# Patient Record
Sex: FEMALE | Race: WHITE | NOT HISPANIC OR LATINO | Employment: OTHER | URBAN - METROPOLITAN AREA
[De-identification: names, ages, dates, MRNs, and addresses within clinical notes are randomized per-mention and may not be internally consistent; named-entity substitution may affect disease eponyms.]

---

## 2017-11-17 ENCOUNTER — TRANSCRIBE ORDERS (OUTPATIENT)
Dept: LAB | Facility: CLINIC | Age: 22
End: 2017-11-17

## 2017-11-17 ENCOUNTER — APPOINTMENT (OUTPATIENT)
Dept: LAB | Facility: CLINIC | Age: 22
End: 2017-11-17
Payer: COMMERCIAL

## 2017-11-17 DIAGNOSIS — M81.0 SENILE OSTEOPOROSIS: Primary | ICD-10-CM

## 2017-11-17 DIAGNOSIS — M81.0 SENILE OSTEOPOROSIS: ICD-10-CM

## 2017-11-17 DIAGNOSIS — E55.9 AVITAMINOSIS D: ICD-10-CM

## 2017-11-17 LAB
25(OH)D3 SERPL-MCNC: 31.6 NG/ML (ref 30–100)
ALBUMIN SERPL BCP-MCNC: 4 G/DL (ref 3.5–5)
ALP SERPL-CCNC: 68 U/L (ref 46–116)
ALT SERPL W P-5'-P-CCNC: 82 U/L (ref 12–78)
ANION GAP SERPL CALCULATED.3IONS-SCNC: 12 MMOL/L (ref 4–13)
AST SERPL W P-5'-P-CCNC: 23 U/L (ref 5–45)
BILIRUB SERPL-MCNC: 0.3 MG/DL (ref 0.2–1)
BUN SERPL-MCNC: 6 MG/DL (ref 5–25)
CALCIUM SERPL-MCNC: 10.1 MG/DL (ref 8.3–10.1)
CHLORIDE SERPL-SCNC: 103 MMOL/L (ref 100–108)
CO2 SERPL-SCNC: 26 MMOL/L (ref 21–32)
CREAT SERPL-MCNC: 0.55 MG/DL (ref 0.6–1.3)
GFR SERPL CREATININE-BSD FRML MDRD: 135 ML/MIN/1.73SQ M
GLUCOSE SERPL-MCNC: 90 MG/DL (ref 65–140)
POTASSIUM SERPL-SCNC: 5 MMOL/L (ref 3.5–5.3)
PROT SERPL-MCNC: 8.5 G/DL (ref 6.4–8.2)
SODIUM SERPL-SCNC: 141 MMOL/L (ref 136–145)

## 2017-11-17 PROCEDURE — 84080 ASSAY ALKALINE PHOSPHATASES: CPT

## 2017-11-17 PROCEDURE — 80053 COMPREHEN METABOLIC PANEL: CPT

## 2017-11-17 PROCEDURE — 82523 COLLAGEN CROSSLINKS: CPT

## 2017-11-17 PROCEDURE — 36415 COLL VENOUS BLD VENIPUNCTURE: CPT

## 2017-11-17 PROCEDURE — 82306 VITAMIN D 25 HYDROXY: CPT

## 2017-11-21 LAB — ALP BONE SERPL-MCNC: 12.7 UG/L

## 2017-11-23 LAB — COLLAGEN CTX SERPL-MCNC: 142 PG/ML

## 2022-01-12 ENCOUNTER — IMMUNIZATIONS (OUTPATIENT)
Dept: FAMILY MEDICINE CLINIC | Facility: HOSPITAL | Age: 27
End: 2022-01-12

## 2022-01-12 DIAGNOSIS — Z23 ENCOUNTER FOR IMMUNIZATION: Primary | ICD-10-CM

## 2022-01-12 PROCEDURE — 91306 COVID-19 MODERNA VACC 0.25 ML BOOSTER: CPT

## 2022-01-12 PROCEDURE — 0064A COVID-19 MODERNA VACC 0.25 ML BOOSTER: CPT

## 2025-01-29 PROBLEM — Z98.2 PRESENCE OF CEREBROSPINAL FLUID DRAINAGE DEVICE: Status: ACTIVE | Noted: 2025-01-29

## 2025-01-29 PROBLEM — K21.9 ESOPHAGEAL REFLUX: Status: ACTIVE | Noted: 2025-01-29

## 2025-01-29 PROBLEM — K59.04 FUNCTIONAL CONSTIPATION: Status: ACTIVE | Noted: 2025-01-29

## 2025-01-29 PROBLEM — G80.9 INFANTILE CEREBRAL PALSY (HCC): Status: ACTIVE | Noted: 2025-01-29

## 2025-01-29 PROBLEM — M20.60 ACQUIRED DEFORMITY OF JOINT OF BIG TOE: Status: ACTIVE | Noted: 2025-01-29

## 2025-01-29 PROBLEM — Q04.3: Status: ACTIVE | Noted: 2025-01-29

## 2025-01-29 PROBLEM — Z93.1 GASTROSTOMY TUBE DEPENDENT (HCC): Status: ACTIVE | Noted: 2025-01-29

## 2025-01-29 PROBLEM — E55.9 VITAMIN D DEFICIENCY: Status: ACTIVE | Noted: 2025-01-29

## 2025-01-29 PROBLEM — M25.376 INSTABILITY OF FOOT JOINT: Status: ACTIVE | Noted: 2025-01-29

## 2025-01-29 PROBLEM — F79 INTELLECTUAL DISABILITY: Status: ACTIVE | Noted: 2025-01-29

## 2025-01-29 PROBLEM — Q65.89 CONGENITAL DYSPLASIA OF BOTH HIPS: Status: ACTIVE | Noted: 2025-01-29

## 2025-01-29 PROBLEM — G91.1 OBSTRUCTIVE HYDROCEPHALUS (HCC): Status: ACTIVE | Noted: 2025-01-29

## 2025-01-29 PROBLEM — M41.20 IDIOPATHIC SCOLIOSIS AND KYPHOSCOLIOSIS: Status: ACTIVE | Noted: 2025-01-29

## 2025-01-29 PROBLEM — Q04.2 HOLOPROSENCEPHALY (HCC): Status: ACTIVE | Noted: 2025-01-29

## 2025-01-29 PROBLEM — M81.8 OTHER OSTEOPOROSIS WITHOUT CURRENT PATHOLOGICAL FRACTURE: Status: ACTIVE | Noted: 2025-01-29

## 2025-01-29 PROBLEM — E28.1 HYPERANDROGENEMIA: Status: ACTIVE | Noted: 2025-01-29

## 2025-01-29 RX ORDER — BACITRACIN, NEOMYCIN, POLYMYXIN B 400; 3.5; 5 [USP'U]/G; MG/G; [USP'U]/G
OINTMENT TOPICAL
COMMUNITY

## 2025-01-29 RX ORDER — PREDNISOLONE SODIUM PHOSPHATE 15 MG/5ML
SOLUTION ORAL
COMMUNITY

## 2025-01-29 RX ORDER — CHOLECALCIFEROL (VITAMIN D3) 10(400)/ML
DROPS ORAL
COMMUNITY

## 2025-01-29 RX ORDER — UNDERPADS 23" X 36"
EACH MISCELLANEOUS
COMMUNITY

## 2025-01-29 RX ORDER — IBUPROFEN 100 MG/5ML
SUSPENSION ORAL
COMMUNITY

## 2025-01-29 RX ORDER — AZITHROMYCIN 200 MG/5ML
POWDER, FOR SUSPENSION ORAL
COMMUNITY

## 2025-01-29 RX ORDER — POLYETHYLENE GLYCOL 3350 17 G/17G
POWDER, FOR SOLUTION ORAL
COMMUNITY

## 2025-01-29 RX ORDER — DIAZEPAM 2 MG/1
TABLET ORAL
COMMUNITY

## 2025-01-29 RX ORDER — BROMPHENIRAMINE MALEATE, PSEUDOEPHEDRINE HYDROCHLORIDE, AND DEXTROMETHORPHAN HYDROBROMIDE 2; 30; 10 MG/5ML; MG/5ML; MG/5ML
SYRUP ORAL
COMMUNITY

## 2025-01-29 RX ORDER — FEXOFENADINE HCL AND PSEUDOEPHEDRINE HCL 180; 240 MG/1; MG/1
TABLET, EXTENDED RELEASE ORAL
COMMUNITY

## 2025-01-29 RX ORDER — ECHINACEA PURPUREA EXTRACT 125 MG
TABLET ORAL
COMMUNITY

## 2025-01-29 RX ORDER — ALBUTEROL SULFATE 0.83 MG/ML
SOLUTION RESPIRATORY (INHALATION)
COMMUNITY

## 2025-01-29 RX ORDER — BACITRACIN ZINC AND POLYMYXIN B SULFATE 500; 1000 [USP'U]/G; [USP'U]/G
OINTMENT TOPICAL
COMMUNITY

## 2025-01-29 RX ORDER — ACETAMINOPHEN 160 MG/5ML
LIQUID ORAL
COMMUNITY

## 2025-01-29 RX ORDER — PROMETHAZINE HYDROCHLORIDE 25 MG/1
SUPPOSITORY RECTAL
COMMUNITY

## 2025-01-29 RX ORDER — NYSTATIN 100000 [USP'U]/ML
SUSPENSION ORAL
COMMUNITY

## 2025-01-29 RX ORDER — LORATADINE ORAL 5 MG/5ML
SOLUTION ORAL
COMMUNITY

## 2025-01-30 ENCOUNTER — EVALUATION (OUTPATIENT)
Dept: SPEECH THERAPY | Facility: CLINIC | Age: 30
End: 2025-01-30
Payer: MEDICARE

## 2025-01-30 DIAGNOSIS — R47.1 DYSARTHRIA: Primary | ICD-10-CM

## 2025-01-30 DIAGNOSIS — R41.841 COGNITIVE COMMUNICATION DISORDER: ICD-10-CM

## 2025-01-30 DIAGNOSIS — R47.89 FLUENCY DISORDER: ICD-10-CM

## 2025-01-30 DIAGNOSIS — G80.8 OTHER CEREBRAL PALSY (HCC): ICD-10-CM

## 2025-01-30 PROCEDURE — 92521 EVALUATION OF SPEECH FLUENCY: CPT | Performed by: SPEECH-LANGUAGE PATHOLOGIST

## 2025-01-30 PROCEDURE — 92522 EVALUATE SPEECH PRODUCTION: CPT | Performed by: SPEECH-LANGUAGE PATHOLOGIST

## 2025-01-30 NOTE — PROGRESS NOTES
Speech-Language Pathology Initial Evaluation    Today's date: 2025   Patient’s name: Rafaela Pruitt  : 1995  MRN: [id number]  Safety measures: h/o CP, ID, fall risk  Referring provider: Griselda Suggs MD    Encounter Diagnosis     ICD-10-CM    1. Dysarthria  R47.1       2. Fluency disorder  R47.89       3. Cognitive communication disorder  R41.841       4. Other cerebral palsy (HCC)  G80.8         Assessment:  Patient participated in a formal fluency evaluation with the SSI-4 on this date of service. Patient's scores correlated with a moderate fluency disorder c/b stuttering-like dysfluencies (9 sound/syllable repetitions and 4 blocks in the speaking task & 1 sound/syllable repetitions and 6 blocks) and secondary behaviors (lip pressing & turning away). It should also be noted that patient demonstrated 60 non-stuttering-like dysfluencies (12 phrase repetitions, 32 interjections, and 16 revisions) in the speaking task. During formal and informal connected speech tasks during the motor speech evaluation, patient was judged to have imprecise articulation, moments of fast rate of speech, reduced vocal intensity/decreased breath support, and breathy vocal quality. Patient's speech intelligibility was judged to be 70% to this unfamiliar listener. All of these factors impact patient's overall communication effectiveness. Patient would benefit from outpatient skilled Speech Therapy services for education/training on speech intelligibility strategies, for therapeutic exercises, for practice with clinician to increase communication success with both familiar and unfamiliar listeners, to target fluency strategies implementation, to facilitate overall improved quality of life, to reduce caregiver burden, for instruction on HEP, and to complete caregiver education/training. Although not formally assessed, patient's mother and nurse reported that cognitive changes are also present. Clinician informally observed this  during session. A goal targeting the implementation of external memory aids (journaling) was also added to the POC to promote increased functional recall and positive communication interactions.      Short-term goals:   Patient and caregivers will be educated on fluency strategies to increase patient's communication effectiveness (to be achieved in 2-3 weeks).     Patient will utilize compensatory fluency strategies (e.g., stuttering modification and fluency enhancing) provided mod verbal cues during scenario-based conversational speaking tasks to decrease stuttering events to <4% (to be achieved in 6-8 weeks).     Patient and caregivers will be educated on speech intelligibility strategies (e.g., over-exaggeration, slow rate, increasing volume, etc.) to promote patient's increased communication success (to be achieved in 2-3 weeks).     Patient will utilize speech intelligibility strategies with min verbal cues while reading words, sentences, and paragraph-length material with <5 communication breakdowns across 5 consecutive sessions to facilitate increased generalization of skills into conversational speech with (to be achieved in 6-8 weeks).     Patient will practice diaphragmatic breathing exercises and sustain /ah/ for >10 seconds to facilitate increased breath support for voice/speaking (to be achieved in 6-8 weeks).     Patient will recall details from her week by utilizing external memory aid strategies (e.g., journaling, note taking, etc.) across 5 consecutive sessions to promote increased functional recall and positive communication interactions (to be achieved in 6-8 weeks).     Long-term goals:  Patient will increase her communication effectiveness in the home and community settings (to be achieved by discharge).     Patient's caregiver's with demonstrate comprehension of patient's recommended HEP (to be achieved by discharge).        Plan:  Patient would benefit from outpatient skilled Speech Therapy  "services: Speech-language therapy    Frequency: 1x every other week (Patient's mother expressed that this will work best with patient's schedule.)  Duration: 3 months    Intervention certification from: 1/30/2025  Intervention certification to: 04/30/2025      Subjective:  History of present illness: Patient is a 29 y.o. female who was referred to outpatient skilled Speech Therapy services for an evaluation. Patient was accompanied to today's session by her mother (Mary) and nurse (Liliana). It was reported that patient's outpatient occupational therapist at [facility] ([place]) recommended that she follow-up with speech therapy services due to cognitive changes, as well as reduced speech intelligibility. Patient's mother stated that patient received OP ST services with [facility] in 2021/2022. Patient's mother and nurse indicated that patient's conversational speech can be c/b prolongations, needing to \"reset a few times\", and having a softer volume of voice production. Patient is, however, able to sing and read without difficulty with fluency per report. It was reported that this was targeted in OP ST services in the past, as well as short-term recall (was journaling, but no longer using this strategy). It was noted that patient has trouble remembering what she ate for breakfast or which classes she had on a specific day (when she was still in school). Patient with strong recall of dialogues in movies and names of actors/actresses. It was reported that patient has trouble \"getting things out...it takes longer\". Patient's mother suspects that this may be related to the amount of sleep she gets the night before.     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 The following results were gathered during today’s evaluation:    Section: Findings/Observations: Score:   *FREQUENCY:  10   -Reading Task (166 syllables) 4.2% syllables stuttered    -Speaking Task (300 syllables) 4.3% syllables stuttered         *DURATION: (avg 3 longest moments of stuttering events) 0.5-0.9 seconds 4        *PHYSICAL CONCOMITANTS: Lip pressing and Turning away 4       TOTAL OVERALL SCORE: 18   Percentile: 61-77%ile   Severity Rating: Moderate       Motor Speech Evaluation:  Patient did not tolerate a formal oral motor evaluation on this date of service. Patient with natural dentition. Facial appearance was symmetrical. Mandible, lingual, labial, and velar functions were unable to be assessed formally. Patient with no anterior loss of saliva. No apraxia was noted. Vocal quality was judged to be breathy with lower vocal intensity. Maximum phonation time testing was attempted, but patient demonstrated difficulty with following direction to sustain /ah/. DDK rates were judged to be slower and irregular. During formal and informal connected speech tasks, patient was judged to have imprecise articulation, moments of fast rate of speech, reduced vocal intensity/decreased breath support, and breathy vocal quality. Patient's speech intelligibility was judged to be 70% to this unfamiliar listener.      Treatment:  N/A      Visit Tracking:  POC   Expires Auth Expiration Date ST Visit Limit   04/30/2025 Pending  BOMN          Visit/Unit Tracking:  Auth Status Date 0130/25   Auth required after IE Used 1    Remaining TBD

## 2025-01-30 NOTE — LETTER
2025    Griselda Suggs MD  [address] [facility] [address]    Patient: Rafaela Pruitt   YOB: 1995   Date of Visit: 2025     Encounter Diagnosis     ICD-10-CM    1. Dysarthria  R47.1       2. Fluency disorder  R47.89       3. Cognitive communication disorder  R41.841       4. Other cerebral palsy (HCC)  G80.8           Dear Dr. Suggs:    Thank you for your recent referral of Rafaela Pruitt. Please review the attached evaluation summary from Rafaela's recent visit.     Please verify that you agree with the plan of care by signing the attached order.     If you have any questions or concerns, please do not hesitate to call.     I sincerely appreciate the opportunity to share in the care of one of your patients and hope to have another opportunity to work with you in the near future.     Sincerely,    Berna Thorpe CCC-SLP      Referring Provider:     Based upon review of the patient's progress and continued therapy plan, it is my medical opinion that Rafaela Pruitt should continue speech therapy treatment at the Physical Therapy at [address]:                    Griselda Suggs MD  [address] [facility] [address]  Via Fax: [phone]        Speech-Language Pathology Initial Evaluation    Today's date: 2025   Patient’s name: Rafaela Pruitt  : 1995  MRN: [id number]  Safety measures: h/o CP, ID, fall risk  Referring provider: Griselda Suggs MD    Encounter Diagnosis     ICD-10-CM    1. Dysarthria  R47.1       2. Fluency disorder  R47.89       3. Cognitive communication disorder  R41.841       4. Other cerebral palsy (HCC)  G80.8         Assessment:  Patient participated in a formal fluency evaluation with the SSI-4 on this date of service. Patient's scores correlated with a moderate fluency disorder c/b stuttering-like dysfluencies (9 sound/syllable repetitions and 4 blocks in the speaking task & 1 sound/syllable repetitions and 6  blocks) and secondary behaviors (lip pressing & turning away). It should also be noted that patient demonstrated 60 non-stuttering-like dysfluencies (12 phrase repetitions, 32 interjections, and 16 revisions) in the speaking task. During formal and informal connected speech tasks during the motor speech evaluation, patient was judged to have imprecise articulation, moments of fast rate of speech, reduced vocal intensity/decreased breath support, and breathy vocal quality. Patient's speech intelligibility was judged to be 70% to this unfamiliar listener. All of these factors impact patient's overall communication effectiveness. Patient would benefit from outpatient skilled Speech Therapy services for education/training on speech intelligibility strategies, for therapeutic exercises, for practice with clinician to increase communication success with both familiar and unfamiliar listeners, to target fluency strategies implementation, to facilitate overall improved quality of life, to reduce caregiver burden, for instruction on HEP, and to complete caregiver education/training. Although not formally assessed, patient's mother and nurse reported that cognitive changes are also present. Clinician informally observed this during session. A goal targeting the implementation of external memory aids (journaling) was also added to the POC to promote increased functional recall and positive communication interactions.      Short-term goals:   Patient and caregivers will be educated on fluency strategies to increase patient's communication effectiveness (to be achieved in 2-3 weeks).     Patient will utilize compensatory fluency strategies (e.g., stuttering modification and fluency enhancing) provided mod verbal cues during scenario-based conversational speaking tasks to decrease stuttering events to <4% (to be achieved in 6-8 weeks).     Patient and caregivers will be educated on speech intelligibility strategies (e.g.,  over-exaggeration, slow rate, increasing volume, etc.) to promote patient's increased communication success (to be achieved in 2-3 weeks).     Patient will utilize speech intelligibility strategies with min verbal cues while reading words, sentences, and paragraph-length material with <5 communication breakdowns across 5 consecutive sessions to facilitate increased generalization of skills into conversational speech with (to be achieved in 6-8 weeks).     Patient will practice diaphragmatic breathing exercises and sustain /ah/ for >10 seconds to facilitate increased breath support for voice/speaking (to be achieved in 6-8 weeks).     Patient will recall details from her week by utilizing external memory aid strategies (e.g., journaling, note taking, etc.) across 5 consecutive sessions to promote increased functional recall and positive communication interactions (to be achieved in 6-8 weeks).     Long-term goals:  Patient will increase her communication effectiveness in the home and community settings (to be achieved by discharge).     Patient's caregiver's with demonstrate comprehension of patient's recommended HEP (to be achieved by discharge).        Plan:  Patient would benefit from outpatient skilled Speech Therapy services: Speech-language therapy    Frequency: 1x every other week (Patient's mother expressed that this will work best with patient's schedule.)  Duration: 3 months    Intervention certification from: 1/30/2025  Intervention certification to: 04/30/2025      Subjective:  History of present illness: Patient is a 29 y.o. female who was referred to outpatient skilled Speech Therapy services for an evaluation. Patient was accompanied to today's session by her mother (Mary) and nurse (Liliaan). It was reported that patient's outpatient occupational therapist at [facility] ([place]) recommended that she follow-up with speech therapy services due to cognitive changes, as well as reduced speech  "intelligibility. Patient's mother stated that patient received OP ST services with [facility] in 2021/2022. Patient's mother and nurse indicated that patient's conversational speech can be c/b prolongations, needing to \"reset a few times\", and having a softer volume of voice production. Patient is, however, able to sing and read without difficulty with fluency per report. It was reported that this was targeted in OP ST services in the past, as well as short-term recall (was journaling, but no longer using this strategy). It was noted that patient has trouble remembering what she ate for breakfast or which classes she had on a specific day (when she was still in school). Patient with strong recall of dialogues in movies and names of actors/actresses. It was reported that patient has trouble \"getting things out...it takes longer\". Patient's mother suspects that this may be related to the amount of sleep she gets the night before.     Patient's mother & nurse's goal(s): \"more fluid speech, processing the thought before it comes out, more clear with reading/talking\"    Pain: Absent (scale: 0/10)    Hearing: WFL for testing (reduced hearing in L ear)  Vision: WFL for testing (wears corrective lens)    Home environment/lifestyle: Lives with mother and father (24-hour nursing services)  Highest level of education: High school  Vocational status: N/A -- volunteered prior to COVID-19 pandemic      Objective:  The Stuttering Severity Instrument 4th edition (SSI-4) is a standardized assessment that can be used for  children, school-aged children, and adults to assess stuttering severity across 4 different areas; (1) frequency of stutter, (2) duration of stutter, (3) physical concomitant behaviors, and (4) naturalness of speech. The following results were gathered during today’s evaluation:    Section: Findings/Observations: Score:   *FREQUENCY:  10   -Reading Task (166 syllables) 4.2% syllables stuttered    -Speaking Task " (300 syllables) 4.3% syllables stuttered         *DURATION: (avg 3 longest moments of stuttering events) 0.5-0.9 seconds 4        *PHYSICAL CONCOMITANTS: Lip pressing and Turning away 4       TOTAL OVERALL SCORE: 18   Percentile: 61-77%ile   Severity Rating: Moderate       Motor Speech Evaluation:  Patient did not tolerate a formal oral motor evaluation on this date of service. Patient with natural dentition. Facial appearance was symmetrical. Mandible, lingual, labial, and velar functions were unable to be assessed formally. Patient with no anterior loss of saliva. No apraxia was noted. Vocal quality was judged to be breathy with lower vocal intensity. Maximum phonation time testing was attempted, but patient demonstrated difficulty with following direction to sustain /ah/. DDK rates were judged to be slower and irregular. During formal and informal connected speech tasks, patient was judged to have imprecise articulation, moments of fast rate of speech, reduced vocal intensity/decreased breath support, and breathy vocal quality. Patient's speech intelligibility was judged to be 70% to this unfamiliar listener.      Treatment:  N/A      Visit Tracking:  POC   Expires Auth Expiration Date ST Visit Limit   04/30/2025 Pending BOMN          Visit/Unit Tracking:  Auth Status Date 0130/25   Auth required after IE Used 1    Remaining TBD

## 2025-02-02 NOTE — PROGRESS NOTES
Daily Speech Treatment Note    Today's date: 2025 ***  Patient’s name: Rafaela Pruitt  : 1995  MRN: [id number]  Safety measures: h/o CP, ID, fall risk   Referring provider: Griselda Suggs MD    Encounter Diagnosis     ICD-10-CM    1. Dysarthria  R47.1       2. Fluency disorder  R47.89       3. Cognitive communication disorder  R41.841       4. Other cerebral palsy (HCC)  G80.8         Visit Tracking:  ***    Subjective/Behavioral:  -***    Objective/Assessment:  -Patient's family member/caregiver was present during today's session.    Short-term goals:  Patient and caregivers will be educated on fluency strategies to increase patient's communication effectiveness (to be achieved in 2-3 weeks).      Patient will utilize compensatory fluency strategies (e.g., stuttering modification and fluency enhancing) provided mod verbal cues during scenario-based conversational speaking tasks to decrease stuttering events to <4% (to be achieved in 6-8 weeks).      Patient and caregivers will be educated on speech intelligibility strategies (e.g., over-exaggeration, slow rate, increasing volume, etc.) to promote patient's increased communication success (to be achieved in 2-3 weeks).      Patient will utilize speech intelligibility strategies with min verbal cues while reading words, sentences, and paragraph-length material with <5 communication breakdowns across 5 consecutive sessions to facilitate increased generalization of skills into conversational speech with (to be achieved in 6-8 weeks).      Patient will practice diaphragmatic breathing exercises and sustain /ah/ for >10 seconds to facilitate increased breath support for voice/speaking (to be achieved in 6-8 weeks).      Patient will recall details from her week by utilizing external memory aid strategies (e.g., journaling, note taking, etc.) across 5 consecutive sessions to promote increased functional recall and positive communication interactions (to be  achieved in 6-8 weeks).     Plan:  -Continue with current plan of care.

## 2025-02-06 ENCOUNTER — APPOINTMENT (OUTPATIENT)
Dept: SPEECH THERAPY | Facility: CLINIC | Age: 30
End: 2025-02-06
Payer: MEDICARE

## 2025-02-06 DIAGNOSIS — R47.1 DYSARTHRIA: Primary | ICD-10-CM

## 2025-02-06 DIAGNOSIS — R41.841 COGNITIVE COMMUNICATION DISORDER: ICD-10-CM

## 2025-02-06 DIAGNOSIS — R47.89 FLUENCY DISORDER: ICD-10-CM

## 2025-02-06 DIAGNOSIS — G80.8 OTHER CEREBRAL PALSY (HCC): ICD-10-CM

## 2025-02-10 NOTE — PROGRESS NOTES
Daily Speech Treatment Note    Today's date: 2025  Patient’s name: Rafaela Pruitt  : 1995  MRN: [id number]  Safety measures: h/o CP, ID, fall risk   Referring provider: Griselda Suggs MD    Encounter Diagnosis     ICD-10-CM    1. Dysarthria  R47.1       2. Fluency disorder  R47.89       3. Cognitive communication disorder  R41.841       4. Other cerebral palsy (HCC)  G80.8         Visit Tracking:  POC   Expires Auth Expiration Date ST Visit Limit   2025 BOMN          Visit/Unit Tracking:  Auth Status Date 25   8 authorized visits  on 2025 Used 1 2    Remaining 7 6     Subjective/Behavioral:  -Patient reported that she was doing well today. She shared in recent events pertaining to [facility] Day with the assistance of her caregiver.     Objective/Assessment:  -Patient's family member/caregiver was present during today's session.    Short-term goals:  Patient and caregivers will be educated on fluency strategies to increase patient's communication effectiveness (to be achieved in 2-3 weeks).   -Clinician created visual cues (index cards with pictures) for patient to practice easy onsets and light contacts.      Patient will utilize compensatory fluency strategies (e.g., stuttering modification and fluency enhancing) provided mod verbal cues during scenario-based conversational speaking tasks to decrease stuttering events to <4% (to be achieved in 6-8 weeks).     Patient and caregivers will be educated on speech intelligibility strategies (e.g., over-exaggeration, slow rate, increasing volume, etc.) to promote patient's increased communication success (to be achieved in 2-3 weeks).   -Clinician created visual cues (index cards with pictures) for patient to practice slow rate of speech, overexaggeration, and pausing.      Patient will utilize speech intelligibility strategies with min verbal cues while reading words, sentences, and paragraph-length material  "with <5 communication breakdowns across 5 consecutive sessions to facilitate increased generalization of skills into conversational speech with (to be achieved in 6-8 weeks).   -Clinician worked with patient and caregiver on developing a list of 10 \"functional phrases\" for patient to practice reading with strategies.      -Patient practiced slow rate of speech and easy onsets provided mod-max verbal/visual cues from clinician today.    Patient will practice diaphragmatic breathing exercises and sustain /ah/ for >10 seconds to facilitate increased breath support for voice/speaking (to be achieved in 6-8 weeks).   -Clinician completed trials of LSVT LOUD with patient today. It is suspected that patient may not be an appropriate candidate secondary to her sensitivity with loud sounds. Clinician was mindful of this when completing models of exercises; attempted to cue patient to \"THINK LOUD!\" to assess if she had a carryover with improved respiratory support, vocal intensity, articulation, rate of speech. Patient benefited from max verbal/visual cues.    Sustained /ah/ x5 (average: 9.52 seconds at 72 dB SPL)    Attempted glides over multiple trials in unison with clinician; cues were not effective with shaping into an effective voice  High (499 Hz at 81 dB SPL)  Low (140 Hz at 71 dB SPL)     Patient will recall details from her week by utilizing external memory aid strategies (e.g., journaling, note taking, etc.) across 5 consecutive sessions to promote increased functional recall and positive communication interactions (to be achieved in 6-8 weeks).     Plan:  -Continue with current plan of care.  "

## 2025-02-20 ENCOUNTER — OFFICE VISIT (OUTPATIENT)
Dept: SPEECH THERAPY | Facility: CLINIC | Age: 30
End: 2025-02-20
Payer: MEDICARE

## 2025-02-20 DIAGNOSIS — R41.841 COGNITIVE COMMUNICATION DISORDER: ICD-10-CM

## 2025-02-20 DIAGNOSIS — G80.8 OTHER CEREBRAL PALSY (HCC): ICD-10-CM

## 2025-02-20 DIAGNOSIS — R47.89 FLUENCY DISORDER: ICD-10-CM

## 2025-02-20 DIAGNOSIS — R47.1 DYSARTHRIA: Primary | ICD-10-CM

## 2025-02-20 PROCEDURE — 92507 TX SP LANG VOICE COMM INDIV: CPT | Performed by: SPEECH-LANGUAGE PATHOLOGIST

## 2025-04-02 ENCOUNTER — HOSPITAL ENCOUNTER (OUTPATIENT)
Dept: RADIOLOGY | Facility: HOSPITAL | Age: 30
Discharge: HOME | End: 2025-04-02
Payer: MEDICARE

## 2025-04-02 DIAGNOSIS — G91.1 OBSTRUCTIVE HYDROCEPHALUS (HCC): ICD-10-CM

## 2025-04-02 PROCEDURE — 70250 X-RAY EXAM OF SKULL: CPT

## 2025-04-02 PROCEDURE — 71046 X-RAY EXAM CHEST 2 VIEWS: CPT

## 2025-04-02 PROCEDURE — 74018 RADEX ABDOMEN 1 VIEW: CPT

## 2025-04-03 ENCOUNTER — OFFICE VISIT (OUTPATIENT)
Dept: SPEECH THERAPY | Facility: CLINIC | Age: 30
End: 2025-04-03
Payer: MEDICARE

## 2025-04-03 DIAGNOSIS — R47.1 DYSARTHRIA: Primary | ICD-10-CM

## 2025-04-03 DIAGNOSIS — R47.89 FLUENCY DISORDER: ICD-10-CM

## 2025-04-03 DIAGNOSIS — R41.841 COGNITIVE COMMUNICATION DISORDER: ICD-10-CM

## 2025-04-03 DIAGNOSIS — G80.8 OTHER CEREBRAL PALSY (HCC): ICD-10-CM

## 2025-04-03 PROCEDURE — 92507 TX SP LANG VOICE COMM INDIV: CPT | Performed by: SPEECH-LANGUAGE PATHOLOGIST

## 2025-04-03 NOTE — PROGRESS NOTES
"Daily Speech Treatment Note    Today's date: 4/3/2025  Patient’s name: Rafaela Pruitt  : 1995  MRN: [id number]  Safety measures: h/o CP, ID, fall risk   Referring provider: Griselda Suggs MD    Encounter Diagnosis     ICD-10-CM    1. Dysarthria  R47.1       2. Fluency disorder  R47.89       3. Cognitive communication disorder  R41.841       4. Other cerebral palsy (HCC)  G80.8           Visit Tracking:  POC   Expires Auth Expiration Date ST Visit Limit   2025 BOMN              Visit/Unit Tracking:  Auth Status Date 25   8 authorized visits  on 2025 Used 1 2     Remaining 7 6     Visit/Unit Tracking:  Auth Status Date 25   8 authorized visits  on 2025 Used 1    Remaining 7     Subjective/Behavioral:  -Patient reported that she was feeling good today.     Objective/Assessment:  -Patient's family member/caregiver was present during today's session. (Nurse Richardson)    Short-term goals:  Patient and caregivers will be educated on fluency strategies to increase patient's communication effectiveness (to be achieved in 2-3 weeks).      Patient will utilize compensatory fluency strategies (e.g., stuttering modification and fluency enhancing) provided mod verbal cues during scenario-based conversational speaking tasks to decrease stuttering events to <4% (to be achieved in 6-8 weeks).   -Patient was noted to have mild blocks on words beginning with \"e\" and \"a\" today. Patient benefited from models from clinician to implement easy onsets. Patient also had mild blocks on words beginning with \"wh\". Patient benefited from models from clinician to implement light contacts.    Patient and caregivers will be educated on speech intelligibility strategies (e.g., over-exaggeration, slow rate, increasing volume, etc.) to promote patient's increased communication success (to be achieved in 2-3 weeks).   -Patient's caregiver reported that she has been practicing " "strategies with patient in the home setting (\"Take a breath and let your voice come out.\")     Patient will utilize speech intelligibility strategies with min verbal cues while reading words, sentences, and paragraph-length material with <5 communication breakdowns across 5 consecutive sessions to facilitate increased generalization of skills into conversational speech with (to be achieved in 6-8 weeks).   -Oral reading--sentences ([place] quotes): Patient was instructed to practice slow rate, increasing vocal intensity, and easy onsets provided visual and verbal cues from clinician during an oral reading exercise. Patient benefited from clinician's mod-max verbal cues. Vocal intensity continues to be reduced. Patient's rate of speech and use of easy onsets was noted to improve as task progressed.    Patient will practice diaphragmatic breathing exercises and sustain /ah/ for >10 seconds to facilitate increased breath support for voice/speaking (to be achieved in 6-8 weeks).      Patient will recall details from her week by utilizing external memory aid strategies (e.g., journaling, note taking, etc.) across 5 consecutive sessions to promote increased functional recall and positive communication interactions (to be achieved in 6-8 weeks).   -Clinician presented patient with a blank monthly calendar and assisted her with writing events in the dates to increase her functional recall (e.g., x-rays yesterday, speech therapy today, and Dad's trip to [place]). Patient benefited from mod cues from her caregiver to recall events that occurred this week. Clinician encouraged patient and caregiver(s) to continue writing in spaces daily to promote patient with increased recall and positive communication interactions. Will return to next appointment.    Plan:  -Patient was provided with home exercises/activities to target goals in plan of care at the end of today's session.  -Continue with current plan of care.  "

## 2025-04-13 NOTE — PROGRESS NOTES
"Daily Speech Treatment Note    Today's date: 2025  Patient’s name: Rafaela Pruitt  : 1995  MRN: [id number]  Safety measures: h/o CP, ID, fall risk   Referring provider: Griselda Suggs MD    Encounter Diagnosis     ICD-10-CM    1. Dysarthria  R47.1       2. Fluency disorder  R47.89       3. Cognitive communication disorder  R41.841       4. Other cerebral palsy (HCC)  G80.8         Visit Tracking:  Visit Tracking:  POC   Expires Auth Expiration Date ST Visit Limit   2025 BOMN              Visit/Unit Tracking:  Auth Status Date 25   8 authorized visits  on 2025 Used 1 2     Remaining 7 6      Visit/Unit Tracking:  Auth Status Date 25   8 authorized visits  on 2025 Used 1 2     Remaining 7 6     Subjective/Behavioral:  -Patient brought along her /journal today.    Objective/Assessment:  -Patient's family member/caregiver was present during today's session.    Short-term goals:  Patient and caregivers will be educated on fluency strategies to increase patient's communication effectiveness (to be achieved in 2-3 weeks).      Patient will utilize compensatory fluency strategies (e.g., stuttering modification and fluency enhancing) provided mod verbal cues during scenario-based conversational speaking tasks to decrease stuttering events to <4% (to be achieved in 6-8 weeks).   -Patient was noted to have frequent phrase repetitions today, especially when excited. There may also be a component related to language organization and word retrieval. Clinician spoke with caregiver how today's focus of treatment was on blocks (I.e., utilizing \"easy speech\" / easy onsets).     Patient and caregivers will be educated on speech intelligibility strategies (e.g., over-exaggeration, slow rate, increasing volume, etc.) to promote patient's increased communication success (to be achieved in 2-3 weeks).   -Patient was noted to benefit from " "cues to use \"big mouth movements\" to slow her rate, increase her vocal intensity, and overexaggeration of speech sounds. Continue with practice.     Patient will utilize speech intelligibility strategies with min verbal cues while reading words, sentences, and paragraph-length material with <5 communication breakdowns across 5 consecutive sessions to facilitate increased generalization of skills into conversational speech with (to be achieved in 6-8 weeks).   -Oral reading--sentences (functional phrases): Patient was instructed to practice \"easy speech\" and slow rate provided visual and verbal cues from clinician. Patient was noted to be successful during trials. Good fluency and speech intelligibility present.     -Oral reading--sentences ([place] quotes, Day 2): Patient was instructed to practice \"easy speech\", slow rate, and big mouth movements provided visual and verbal cues from clinician during an oral reading exercise. Patient benefited from clinician's mod-max verbal cues when experiencing a breakdown with communication.     Patient will practice diaphragmatic breathing exercises and sustain /ah/ for >10 seconds to facilitate increased breath support for voice/speaking (to be achieved in 6-8 weeks).      Patient will recall details from her week by utilizing external memory aid strategies (e.g., journaling, note taking, etc.) across 5 consecutive sessions to promote increased functional recall and positive communication interactions (to be achieved in 6-8 weeks).   -Patient utilized her external memory aid (April calendar/journal) to share in events that occurred since her last therapy session. Patient required max cues to orient to today's date. Patient was able to successfully use the notes written in the daily slots to recall events (e.g., made Bulgarian rice with Mom, PT/OT sessions, Sneha hoover with Miss Ford).    Plan:  -Continue with current plan of care.  "

## 2025-04-17 ENCOUNTER — OFFICE VISIT (OUTPATIENT)
Dept: SPEECH THERAPY | Facility: CLINIC | Age: 30
End: 2025-04-17
Payer: MEDICARE

## 2025-04-17 DIAGNOSIS — R47.89 FLUENCY DISORDER: ICD-10-CM

## 2025-04-17 DIAGNOSIS — R47.1 DYSARTHRIA: Primary | ICD-10-CM

## 2025-04-17 DIAGNOSIS — G80.8 OTHER CEREBRAL PALSY (HCC): ICD-10-CM

## 2025-04-17 DIAGNOSIS — R41.841 COGNITIVE COMMUNICATION DISORDER: ICD-10-CM

## 2025-04-17 PROCEDURE — 92507 TX SP LANG VOICE COMM INDIV: CPT | Performed by: SPEECH-LANGUAGE PATHOLOGIST

## 2025-05-01 ENCOUNTER — APPOINTMENT (OUTPATIENT)
Dept: SPEECH THERAPY | Facility: CLINIC | Age: 30
End: 2025-05-01
Payer: MEDICARE

## 2025-05-15 ENCOUNTER — EVALUATION (OUTPATIENT)
Dept: SPEECH THERAPY | Facility: CLINIC | Age: 30
End: 2025-05-15
Payer: MEDICARE

## 2025-05-15 DIAGNOSIS — R47.1 DYSARTHRIA: Primary | ICD-10-CM

## 2025-05-15 DIAGNOSIS — R41.841 COGNITIVE COMMUNICATION DISORDER: ICD-10-CM

## 2025-05-15 DIAGNOSIS — G80.8 OTHER CEREBRAL PALSY (HCC): ICD-10-CM

## 2025-05-15 DIAGNOSIS — R47.89 FLUENCY DISORDER: ICD-10-CM

## 2025-05-15 PROCEDURE — 92507 TX SP LANG VOICE COMM INDIV: CPT | Performed by: SPEECH-LANGUAGE PATHOLOGIST

## 2025-05-15 NOTE — PROGRESS NOTES
Speech-Language Pathology Re-Evaluation    Today's date: 5/15/2025   Patient’s name: Rafaela Pruitt  : 1995  MRN: [id number]  Safety measures: h/o CP, ID, fall risk  Referring provider: Griselda Suggs MD    Encounter Diagnosis     ICD-10-CM    1. Dysarthria  R47.1       2. Fluency disorder  R47.89       3. Cognitive communication disorder  R41.841       4. Other cerebral palsy (HCC)  G80.8         Assessment:  Patient presents with a moderate fluency disorder c/b sound/syllable repetitions, blocks, secondary behaviors (lip pressing & turning away). Patient also demonstrates non-stuttering-like dysfluencies, including phrase repetitions, interjections, and revisions. Patient demonstrates imprecise articulation, moments of fast rate of speech, reduced vocal intensity/decreased breath support, and breathy vocal quality. All of these factors impact patient's overall communication effectiveness.     Clinician created visual cues (index cards with pictures) for patient to practice strategies to increase her communication success. Practice with strategies during oral readings, as well as during conversations, are completed; patient benefits from cues from clinician to implement strategies. Patient’s nurse reported that she practices with patient at home, too. During speech therapy, clinician completed trials of LSVT LOUD with patient; however, it was determined that execution of exercises and carryover was limited. Patient demonstrates increased sensitivity with loud sounds. In regard to her cognition/memory, patient has been working with her caregivers on writing entries in her blank monthly calendar to increase her functional recall and positive communication interactions. Patient benefits from cues to recall events that occur from day-to-day with this external memory aid.     Patient has demonstrated limited progress toward the goals as established in her plan of care secondary to inconsistent attendance in  outpatient skilled Speech Therapy services. Following her initial evaluation on 01/30/2025, patient was only seen for a total of 4 sessions (including today's session). Clinician spoke with patient's mother and nurse about the recommendation of increasing frequency to 1x/week as discussed during her evaluation. Patient's mother expressed that bi-weekly appointments work best with patient's schedule.     Patient would benefit from outpatient skilled Speech Therapy services for continued education/training on speech intelligibility strategies, for therapeutic exercises, for practice with clinician to increase communication success with both familiar and unfamiliar listeners, to target fluency strategies implementation, to facilitate overall improved quality of life, to reduce caregiver burden, for instruction on HEP, and to complete caregiver education/training.       Short-term goals:   Patient and caregivers will be educated on fluency strategies to increase patient's communication effectiveness (to be achieved in 2-3 weeks). -- PARTIALLY MET    Patient will utilize compensatory fluency strategies (e.g., stuttering modification and fluency enhancing) provided mod verbal cues during scenario-based conversational speaking tasks to decrease stuttering events to <4% (to be achieved in 6-8 weeks). -- PARTIALLY MET    Patient and caregivers will be educated on speech intelligibility strategies (e.g., over-exaggeration, slow rate, increasing volume, etc.) to promote patient's increased communication success (to be achieved in 2-3 weeks). -- PARTIALLY MET    Patient will utilize speech intelligibility strategies with min verbal cues while reading words, sentences, and paragraph-length material with <5 communication breakdowns across 5 consecutive sessions to facilitate increased generalization of skills into conversational speech with (to be achieved in 6-8 weeks). -- PARTIALLY MET    Patient will practice diaphragmatic  "breathing exercises and sustain /ah/ for >10 seconds to facilitate increased breath support for voice/speaking (to be achieved in 6-8 weeks). -- PARTIALLY MET    Patient will recall details from her week by utilizing external memory aid strategies (e.g., journaling, note taking, etc.) across 5 consecutive sessions to promote increased functional recall and positive communication interactions (to be achieved in 6-8 weeks). -- PARTIALLY MET    Long-term goals:  Patient will increase her communication effectiveness in the home and community settings (to be achieved by discharge). -- CONTINUE    Patient's caregiver's with demonstrate comprehension of patient's recommended HEP (to be achieved by discharge). -- CONTINUE      Plan:  Patient would benefit from outpatient skilled Speech Therapy services: Speech-language therapy    Frequency: 1x/week (Patient's mother expressed that bi-weekly appointments (1) will work best with patient's schedule.)  Duration: 3 months    Intervention certification from: 5/15/2025  Intervention certification to: 08/15/2025      Subjective:  Patient reported that she is feeling better having recovered from her illness.    Patient's mother & nurse's goal(s): \"more fluid speech, processing the thought before it comes out, more clear with reading/talking\"    Pain: Absent      Objective:  No formal testing was completed on this date of service. The following serves as a diagnostic treatment session and progress update.      Treatment:  -Clinician utilized mod-max verbal and visual (index card) cues during treatment session today. Patient worked on light contacts, “easy speech”, and \"big mouth movements\". Patient practiced during a variety of connected speech tasks, including oral reading of sentences and functional phrases, cognitive activities (e.g., lists category members, phrase completion), and conversation. Mild blocks and phrase repetitions noted today, especially when patient was " excited.    -Patient utilized her external memory aid (May calendar/journal) to share in events that occurred since her last therapy session. Patient required max cues to orient to today's date. Patient was able to successfully use the notes written in the daily slots to recall events (e.g., repainted nails, made chocolate chip cookies). Patient benefited from max verbal cues from her nurse to recall events not written in her calendar (e.g., what she ate for breakfast today).      Visit Tracking:  POC   Expires Auth Expiration Date ST Visit Limit   08/15/2025 2025 BOMN              Visit/Unit Tracking:  Auth Status Date 25   8 authorized visits  on 2025 Used 1 2     Remaining 7 6      Visit/Unit Tracking:  Auth Status Date 04/03/25 04/17/25 05/15/25   8 authorized visits  on 2025 Used 1 2 3     Remaining 7 6 5

## 2025-05-15 NOTE — LETTER
May 15, 2025    No Recipients    Patient: Rafaela Pruitt   YOB: 1995   Date of Visit: 5/15/2025     Encounter Diagnosis     ICD-10-CM    1. Dysarthria  R47.1       2. Fluency disorder  R47.89       3. Cognitive communication disorder  R41.841       4. Other cerebral palsy (HCC)  G80.8           Dear Dr. Griselda Suggs MD:    Thank you for your recent referral of Rafaela Pruitt. Please review the attached evaluation summary from Rafaela's recent visit.     Please verify that you agree with the plan of care by signing the attached order.     If you have any questions or concerns, please do not hesitate to call.     I sincerely appreciate the opportunity to share in the care of one of your patients and hope to have another opportunity to work with you in the near future.     Sincerely,    Berna Thorpe CCC-SLP      Referring Provider:     Based upon review of the patient's progress and continued therapy plan, it is my medical opinion that Rafaela Pruitt should continue speech therapy treatment at the [facility] Therapy at [address]:                    Griselda Suggs MD  [address] [place] [id number]  Via Fax: [phone]        Speech-Language Pathology Re-Evaluation    Today's date: 5/15/2025   Patient’s name: Rafaela Pruitt  : 1995  MRN: [id number]  Safety measures: h/o CP, ID, fall risk  Referring provider: Griselda Suggs MD    Encounter Diagnosis     ICD-10-CM    1. Dysarthria  R47.1       2. Fluency disorder  R47.89       3. Cognitive communication disorder  R41.841       4. Other cerebral palsy (HCC)  G80.8         Assessment:  Patient presents with a moderate fluency disorder c/b sound/syllable repetitions, blocks, secondary behaviors (lip pressing & turning away). Patient also demonstrates non-stuttering-like dysfluencies, including phrase repetitions, interjections, and revisions. Patient demonstrates imprecise articulation, moments of fast rate of speech,  reduced vocal intensity/decreased breath support, and breathy vocal quality. All of these factors impact patient's overall communication effectiveness.     Clinician created visual cues (index cards with pictures) for patient to practice strategies to increase her communication success. Practice with strategies during oral readings, as well as during conversations, are completed; patient benefits from cues from clinician to implement strategies. Patient’s nurse reported that she practices with patient at home, too. During speech therapy, clinician completed trials of LSVT LOUD with patient; however, it was determined that execution of exercises and carryover was limited. Patient demonstrates increased sensitivity with loud sounds. In regard to her cognition/memory, patient has been working with her caregivers on writing entries in her blank monthly calendar to increase her functional recall and positive communication interactions. Patient benefits from cues to recall events that occur from day-to-day with this external memory aid.     Patient has demonstrated limited progress toward the goals as established in her plan of care secondary to inconsistent attendance in outpatient skilled Speech Therapy services. Following her initial evaluation on 01/30/2025, patient was only seen for a total of 4 sessions (including today's session). Clinician spoke with patient's mother and nurse about the recommendation of increasing frequency to 1x/week as discussed during her evaluation. Patient's mother expressed that bi-weekly appointments work best with patient's schedule.     Patient would benefit from outpatient skilled Speech Therapy services for continued education/training on speech intelligibility strategies, for therapeutic exercises, for practice with clinician to increase communication success with both familiar and unfamiliar listeners, to target fluency strategies implementation, to facilitate overall improved  quality of life, to reduce caregiver burden, for instruction on HEP, and to complete caregiver education/training.       Short-term goals:   Patient and caregivers will be educated on fluency strategies to increase patient's communication effectiveness (to be achieved in 2-3 weeks). -- PARTIALLY MET    Patient will utilize compensatory fluency strategies (e.g., stuttering modification and fluency enhancing) provided mod verbal cues during scenario-based conversational speaking tasks to decrease stuttering events to <4% (to be achieved in 6-8 weeks). -- PARTIALLY MET    Patient and caregivers will be educated on speech intelligibility strategies (e.g., over-exaggeration, slow rate, increasing volume, etc.) to promote patient's increased communication success (to be achieved in 2-3 weeks). -- PARTIALLY MET    Patient will utilize speech intelligibility strategies with min verbal cues while reading words, sentences, and paragraph-length material with <5 communication breakdowns across 5 consecutive sessions to facilitate increased generalization of skills into conversational speech with (to be achieved in 6-8 weeks). -- PARTIALLY MET    Patient will practice diaphragmatic breathing exercises and sustain /ah/ for >10 seconds to facilitate increased breath support for voice/speaking (to be achieved in 6-8 weeks). -- PARTIALLY MET    Patient will recall details from her week by utilizing external memory aid strategies (e.g., journaling, note taking, etc.) across 5 consecutive sessions to promote increased functional recall and positive communication interactions (to be achieved in 6-8 weeks). -- PARTIALLY MET    Long-term goals:  Patient will increase her communication effectiveness in the home and community settings (to be achieved by discharge). -- CONTINUE    Patient's caregiver's with demonstrate comprehension of patient's recommended HEP (to be achieved by discharge). -- CONTINUE      Plan:  Patient would benefit  "from outpatient skilled Speech Therapy services: Speech-language therapy    Frequency: 1x/week (Patient's mother expressed that bi-weekly appointments (1) will work best with patient's schedule, however.)  Duration: 3 months    Intervention certification from: 5/15/2025  Intervention certification to: 08/15/2025      Subjective:  Patient reported that she is feeling better having recovered from her illness.    Patient's mother & nurse's goal(s): \"more fluid speech, processing the thought before it comes out, more clear with reading/talking\"    Pain: Absent      Objective:  No formal testing was completed on this date of service. The following serves as a diagnostic treatment session and progress update.      Treatment:  -Clinician utilized mod-max verbal and visual (index card) cues during treatment session today. Patient worked on light contacts, “easy speech”, and \"big mouth movements\". Patient practiced during a variety of connected speech tasks, including oral reading of sentences and functional phrases, cognitive activities (e.g., lists category members, phrase completion), and conversation. Mild blocks and phrase repetitions noted today, especially when patient was excited.    -Patient utilized her external memory aid (May calendar/journal) to share in events that occurred since her last therapy session. Patient required max cues to orient to today's date. Patient was able to successfully use the notes written in the daily slots to recall events (e.g., repainted nails, made chocolate chip cookies). Patient benefited from max verbal cues from her nurse to recall events not written in her calendar (e.g., what she ate for breakfast today).      Visit Tracking:  POC   Expires Auth Expiration Date ST Visit Limit   08/15/2025 2025 BOMN              Visit/Unit Tracking:  Auth Status Date 25   8 authorized visits  on 2025 Used 1 2     Remaining 7 6      Visit/Unit Tracking:  Auth " Status Date 04/03/25 04/17/25 05/15/25   8 authorized visits  on 2025 Used 1 2 3     Remaining 7 6 5

## 2025-05-27 NOTE — PROGRESS NOTES
Daily Speech Treatment Note    Today's date: 2025  Patient’s name: Rafaela Pruitt  : 1995  MRN: [id number]  Safety measures: h/o CP, ID, fall risk   Referring provider: Griselda Suggs MD    Encounter Diagnosis     ICD-10-CM    1. Dysarthria  R47.1       2. Fluency disorder  R47.89       3. Cognitive communication disorder  R41.841       4. Other cerebral palsy (HCC)  G80.8         Visit Tracking:  POC   Expires Auth Expiration Date ST Visit Limit   08/15/2025 2025 BOMN              Visit/Unit Tracking:  Auth Status Date 25   8 authorized visits  on 2025 Used 1 2     Remaining 7 6      Visit/Unit Tracking:  Auth Status Date 04/03/25 04/17/25 05/15/25 05/29/25   8 authorized visits  on 2025 Used 1 2 3 4     Remaining 7 6 5 4     Subjective/Behavioral:  -Patient arrived in good spirits and was motivated throughout session.    Objective/Assessment:  -Patient's family member/caregiver was present during today's session.    Short-term goals:  Patient and caregivers will be educated on fluency strategies to increase patient's communication effectiveness (to be achieved in 2-3 weeks).      Patient will utilize compensatory fluency strategies (e.g., stuttering modification and fluency enhancing) provided mod verbal cues during scenario-based conversational speaking tasks to decrease stuttering events to <4% (to be achieved in 6-8 weeks).     Patient and caregivers will be educated on speech intelligibility strategies (e.g., over-exaggeration, slow rate, increasing volume, etc.) to promote patient's increased communication success (to be achieved in 2-3 weeks).     Patient will utilize speech intelligibility strategies with min verbal cues while reading words, sentences, and paragraph-length material with <5 communication breakdowns across 5 consecutive sessions to facilitate increased generalization of skills into conversational speech with (to be achieved in 6-8  weeks).   -Connected speech tasks: With max verbal and visual cues from clinician, patient engaged in sentence, short paragraph, picture description, and conversational speaking tasks while practicing her fluency and speech intelligibility strategies.   *Sentence level: Patient was judged to be >90% fluent.  *Short paragraph level: Patient was judged to be approx. 70% fluent.  *Picture description level: Patient was judged to be 80% fluent.  *Conversational speaking level: Patient was judged to be 75% fluent.     Patient will practice diaphragmatic breathing exercises and sustain /ah/ for >10 seconds to facilitate increased breath support for voice/speaking (to be achieved in 6-8 weeks).      Patient will recall details from her week by utilizing external memory aid strategies (e.g., journaling, note taking, etc.) across 5 consecutive sessions to promote increased functional recall and positive communication interactions (to be achieved in 6-8 weeks).   -Patient shared with clinician the activities in which she participated since her last appointment using the notes written in her monthly calendar. Patient's caregiver(s) have been consistent with carrying over this external memory strategy to facilitate patient's functional recall.     Plan:  -Continue with current plan of care.

## 2025-05-29 ENCOUNTER — OFFICE VISIT (OUTPATIENT)
Dept: SPEECH THERAPY | Facility: CLINIC | Age: 30
End: 2025-05-29
Payer: MEDICARE

## 2025-05-29 DIAGNOSIS — G80.8 OTHER CEREBRAL PALSY (HCC): ICD-10-CM

## 2025-05-29 DIAGNOSIS — R47.1 DYSARTHRIA: Primary | ICD-10-CM

## 2025-05-29 DIAGNOSIS — R41.841 COGNITIVE COMMUNICATION DISORDER: ICD-10-CM

## 2025-05-29 DIAGNOSIS — R47.89 FLUENCY DISORDER: ICD-10-CM

## 2025-05-29 PROCEDURE — 92507 TX SP LANG VOICE COMM INDIV: CPT | Performed by: SPEECH-LANGUAGE PATHOLOGIST

## 2025-07-03 ENCOUNTER — TELEPHONE (OUTPATIENT)
Dept: SPEECH THERAPY | Facility: CLINIC | Age: 30
End: 2025-07-03

## 2025-07-03 NOTE — TELEPHONE ENCOUNTER
Clinician was informed that patient's mother called to cancel/place patient's outpatient skilled Speech Therapy appointments on hold until her nurse is able to bring her again. She is reportedly out of town until 09/02/2025.    Per her request, clinician attempted to contact patient's mother via telephone. Clinician left a message with the office's call back number today.